# Patient Record
Sex: FEMALE | HISPANIC OR LATINO | URBAN - METROPOLITAN AREA
[De-identification: names, ages, dates, MRNs, and addresses within clinical notes are randomized per-mention and may not be internally consistent; named-entity substitution may affect disease eponyms.]

---

## 2017-06-04 ENCOUNTER — EMERGENCY (EMERGENCY)
Facility: HOSPITAL | Age: 22
LOS: 0 days | Discharge: HOME | End: 2017-06-04
Attending: EMERGENCY MEDICINE | Admitting: EMERGENCY MEDICINE
Payer: MEDICAID

## 2017-06-04 VITALS
HEART RATE: 80 BPM | DIASTOLIC BLOOD PRESSURE: 76 MMHG | WEIGHT: 160.06 LBS | RESPIRATION RATE: 16 BRPM | OXYGEN SATURATION: 100 % | TEMPERATURE: 99 F | SYSTOLIC BLOOD PRESSURE: 115 MMHG | HEIGHT: 62 IN

## 2017-06-04 DIAGNOSIS — O20.0 THREATENED ABORTION: ICD-10-CM

## 2017-06-04 DIAGNOSIS — O20.9 HEMORRHAGE IN EARLY PREGNANCY, UNSPECIFIED: ICD-10-CM

## 2017-06-04 DIAGNOSIS — Z3A.01 LESS THAN 8 WEEKS GESTATION OF PREGNANCY: ICD-10-CM

## 2017-06-04 LAB
ABO RH CONFIRMATION: SIGNIFICANT CHANGE UP
ALBUMIN SERPL ELPH-MCNC: 4.2 G/DL — SIGNIFICANT CHANGE UP (ref 3.3–5)
ALP SERPL-CCNC: 43 U/L — SIGNIFICANT CHANGE UP (ref 40–120)
ALT FLD-CCNC: 19 U/L — SIGNIFICANT CHANGE UP (ref 12–78)
ANION GAP SERPL CALC-SCNC: 6 MMOL/L — SIGNIFICANT CHANGE UP (ref 5–17)
APTT BLD: 30.5 SEC — SIGNIFICANT CHANGE UP (ref 27.5–37.4)
AST SERPL-CCNC: 16 U/L — SIGNIFICANT CHANGE UP (ref 15–37)
BASOPHILS # BLD AUTO: 0.1 K/UL — SIGNIFICANT CHANGE UP (ref 0–0.2)
BASOPHILS NFR BLD AUTO: 0.9 % — SIGNIFICANT CHANGE UP (ref 0–2)
BILIRUB SERPL-MCNC: 1.1 MG/DL — SIGNIFICANT CHANGE UP (ref 0.2–1.2)
BLD GP AB SCN SERPL QL: SIGNIFICANT CHANGE UP
BUN SERPL-MCNC: 6 MG/DL — LOW (ref 7–23)
CALCIUM SERPL-MCNC: 8.8 MG/DL — SIGNIFICANT CHANGE UP (ref 8.5–10.1)
CHLORIDE SERPL-SCNC: 105 MMOL/L — SIGNIFICANT CHANGE UP (ref 96–108)
CO2 SERPL-SCNC: 27 MMOL/L — SIGNIFICANT CHANGE UP (ref 22–31)
CREAT SERPL-MCNC: 0.53 MG/DL — SIGNIFICANT CHANGE UP (ref 0.5–1.3)
EOSINOPHIL # BLD AUTO: 0.1 K/UL — SIGNIFICANT CHANGE UP (ref 0–0.5)
EOSINOPHIL NFR BLD AUTO: 1.7 % — SIGNIFICANT CHANGE UP (ref 0–6)
GLUCOSE SERPL-MCNC: 79 MG/DL — SIGNIFICANT CHANGE UP (ref 70–99)
HCT VFR BLD CALC: 40.6 % — SIGNIFICANT CHANGE UP (ref 34.5–45)
HGB BLD-MCNC: 14.2 G/DL — SIGNIFICANT CHANGE UP (ref 11.5–15.5)
INR BLD: 1.17 RATIO — HIGH (ref 0.88–1.16)
LYMPHOCYTES # BLD AUTO: 1.5 K/UL — SIGNIFICANT CHANGE UP (ref 1–3.3)
LYMPHOCYTES # BLD AUTO: 20.5 % — SIGNIFICANT CHANGE UP (ref 13–44)
MCHC RBC-ENTMCNC: 31.7 PG — SIGNIFICANT CHANGE UP (ref 27–34)
MCHC RBC-ENTMCNC: 34.8 GM/DL — SIGNIFICANT CHANGE UP (ref 32–36)
MCV RBC AUTO: 90.9 FL — SIGNIFICANT CHANGE UP (ref 80–100)
MONOCYTES # BLD AUTO: 0.5 K/UL — SIGNIFICANT CHANGE UP (ref 0–0.9)
MONOCYTES NFR BLD AUTO: 6.8 % — SIGNIFICANT CHANGE UP (ref 2–14)
NEUTROPHILS # BLD AUTO: 5.2 K/UL — SIGNIFICANT CHANGE UP (ref 1.8–7.4)
NEUTROPHILS NFR BLD AUTO: 70.1 % — SIGNIFICANT CHANGE UP (ref 43–77)
PLATELET # BLD AUTO: 277 K/UL — SIGNIFICANT CHANGE UP (ref 150–400)
POTASSIUM SERPL-MCNC: 3.7 MMOL/L — SIGNIFICANT CHANGE UP (ref 3.5–5.3)
POTASSIUM SERPL-SCNC: 3.7 MMOL/L — SIGNIFICANT CHANGE UP (ref 3.5–5.3)
PROT SERPL-MCNC: 7.8 GM/DL — SIGNIFICANT CHANGE UP (ref 6–8.3)
PROTHROM AB SERPL-ACNC: 12.7 SEC — SIGNIFICANT CHANGE UP (ref 9.8–12.7)
RBC # BLD: 4.47 M/UL — SIGNIFICANT CHANGE UP (ref 3.8–5.2)
RBC # FLD: 12 % — SIGNIFICANT CHANGE UP (ref 10.3–14.5)
SODIUM SERPL-SCNC: 138 MMOL/L — SIGNIFICANT CHANGE UP (ref 135–145)
TYPE + AB SCN PNL BLD: SIGNIFICANT CHANGE UP
WBC # BLD: 7.4 K/UL — SIGNIFICANT CHANGE UP (ref 3.8–10.5)
WBC # FLD AUTO: 7.4 K/UL — SIGNIFICANT CHANGE UP (ref 3.8–10.5)

## 2017-06-04 PROCEDURE — 99284 EMERGENCY DEPT VISIT MOD MDM: CPT

## 2017-06-04 PROCEDURE — 76830 TRANSVAGINAL US NON-OB: CPT | Mod: 26

## 2017-06-04 RX ORDER — SODIUM CHLORIDE 9 MG/ML
3 INJECTION INTRAMUSCULAR; INTRAVENOUS; SUBCUTANEOUS ONCE
Qty: 0 | Refills: 0 | Status: COMPLETED | OUTPATIENT
Start: 2017-06-04 | End: 2017-06-04

## 2017-06-04 RX ORDER — ACETAMINOPHEN 500 MG
650 TABLET ORAL ONCE
Qty: 0 | Refills: 0 | Status: COMPLETED | OUTPATIENT
Start: 2017-06-04 | End: 2017-06-04

## 2017-06-04 RX ORDER — SODIUM CHLORIDE 9 MG/ML
1000 INJECTION INTRAMUSCULAR; INTRAVENOUS; SUBCUTANEOUS ONCE
Qty: 0 | Refills: 0 | Status: COMPLETED | OUTPATIENT
Start: 2017-06-04 | End: 2017-06-04

## 2017-06-04 RX ORDER — ONDANSETRON 8 MG/1
4 TABLET, FILM COATED ORAL ONCE
Qty: 0 | Refills: 0 | Status: DISCONTINUED | OUTPATIENT
Start: 2017-06-04 | End: 2017-06-04

## 2017-06-04 RX ADMIN — SODIUM CHLORIDE 1000 MILLILITER(S): 9 INJECTION INTRAMUSCULAR; INTRAVENOUS; SUBCUTANEOUS at 10:05

## 2017-06-04 RX ADMIN — SODIUM CHLORIDE 3 MILLILITER(S): 9 INJECTION INTRAMUSCULAR; INTRAVENOUS; SUBCUTANEOUS at 10:05

## 2017-06-04 NOTE — ED ADULT NURSE NOTE - OBJECTIVE STATEMENT
Pt states she is 6 weeks pregnant and started have a small amount of brown discharge. Denies pain or cramping. Pt states she was worried about the discharge and came here. This is her first pregnancy

## 2017-06-04 NOTE — ED ADULT NURSE NOTE - ED STAT RN HANDOFF DETAILS
mauro rpt from Allison, Constantin - pt approached team asking for crackers, will wait for US results. rcvd rpt from Allison Rn - IV complete, pt ambulatory.

## 2017-06-04 NOTE — ED PROVIDER NOTE - OBJECTIVE STATEMENT
22 year old female 6 weeks EGA, pregnancy with recent US that showed fetal HR, presents with cc of vaginal spotting that progressively worsened into vaginal bleeding and clotting today. Complains of mild diffuse abdominal spasm. No fever or chills. No melena or hematochezia. No dysuria hematuria or frequency. No recent exotic travel. No IVDU. No ETOH abuse. Ambulatory. 22 year old female 6 weeks EGA, pregnancy with recent US that showed fetal HR as per patient, presents with cc of vaginal spotting that progressively worsened into vaginal bleeding and clotting today. Complains of mild diffuse abdominal cramping. No fever or chills. No melena or hematochezia. No dysuria hematuria or frequency. No recent exotic travel. No IVDU. No ETOH abuse. Ambulatory. Complains of mild abdominal cramping, described as mild, non radiating. No vaginal discharge. Had recent gynecological exam at Ob Gyn.

## 2017-06-04 NOTE — ED PROVIDER NOTE - MEDICAL DECISION MAKING DETAILS
Isatu GRAVES: 7 weeks gestation, positive Rh blood, spoke with Dr. Ramey who states that patient to follow up outpatient with gyn and to return if worsening bleeding of if any health concerns. Isatu GRAVES: 7 weeks gestation, positive Rh blood, spoke with Dr. Ramey (Gyn) who states that patient to follow up outpatient with gyn and to return if worsening bleeding of if any health concerns. Instructions for threatened miscarriage and outpatient follow up and instructions to return if any lightheadedness, or worsening of symptoms provided for patient prior to d/c.

## 2017-06-04 NOTE — ED PROVIDER NOTE - CPE EDP CARDIAC NORM
[facility]    [address] [place] [address]    Phone:  [phone]    Fax:  [phone]       Thank You for choosing us for your health care visit. We are glad to serve you and happy to provide you with this summary of your visit. Please help us to ensure we have accurate records. If you find anything that needs to be changed, please let our staff know as soon as possible.          Your Demographic Information     Patient Name Sex     Haley Veloz Female 1987       Ethnic Group Patient Race    Not of  or  Origin White      Your Visit Details     Date & Time Provider Department    2017 10:45 AM Miah Lao MD [facility]      Your Upcoming Appointment*(Max 10)     2017 10:00 AM CDT   Complete Physical Exam with Norma Vázquez MD   [facility]  ([facility])    [address] [place] [id number] [phone]              Your To Do List     Future Orders Please Complete On or Around Expires    CBC & AUTO DIFFERENTIAL  May 01, 2017 May 31, 2017      Conditions Discussed Today or Order-Related Diagnoses        Comments    Routine physical examination    -  Primary       Your Vitals Were     BP Pulse Temp Resp Height Weight    102/60 (BP Location: E, Patient Position: Sitting, Cuff Size: Regular) 64 97.6 °F (36.4 °C) (Tympanic) 12 5' 7\" (1.702 m) 126 lb 7 oz (57.4 kg)    BMI Smoking Status                19.8 kg/m2 Never Smoker          Medications Prescribed or Re-Ordered Today     None      Your Current Medications Are        Disp Refills Start End    Multiple Vitamins-Minerals (MULTIVITAMIN PO)        Class: Historical Med    Route: Oral    docusate sodium (COLACE) 100 MG capsule        Sig - Route: Take 100 mg by mouth daily. - Oral    Class: Historical Med      Discontinued  Medications        Reason for Discontinue    azithromycin (ZITHROMAX) 250 MG tablet Therapy Completed    fluticasone (FLONASE) 50 MCG/ACT nasal spray Therapy Completed    norethindrone (MICRONOR) 0.35 MG tablet Not Needed      Allergies     No Known Allergies      Immunizations History as of 5/1/2017     Name Date    Td:Adult type tetanus/diphtheria 9/12/2011    Tdap 1/5/2016            Patient Instructions     None       normal...

## 2017-07-10 PROBLEM — Z00.00 ENCOUNTER FOR PREVENTIVE HEALTH EXAMINATION: Status: ACTIVE | Noted: 2017-07-10

## 2017-07-11 ENCOUNTER — ASOB RESULT (OUTPATIENT)
Age: 22
End: 2017-07-11

## 2017-07-11 ENCOUNTER — APPOINTMENT (OUTPATIENT)
Dept: ANTEPARTUM | Facility: CLINIC | Age: 22
End: 2017-07-11

## 2017-09-12 ENCOUNTER — APPOINTMENT (OUTPATIENT)
Dept: ANTEPARTUM | Facility: CLINIC | Age: 22
End: 2017-09-12
Payer: MEDICAID

## 2017-09-12 ENCOUNTER — ASOB RESULT (OUTPATIENT)
Age: 22
End: 2017-09-12

## 2017-09-12 PROCEDURE — 76811 OB US DETAILED SNGL FETUS: CPT

## 2017-09-18 ENCOUNTER — OUTPATIENT (OUTPATIENT)
Dept: INPATIENT UNIT | Facility: HOSPITAL | Age: 22
LOS: 1 days | Discharge: HOME | End: 2017-09-18

## 2017-09-18 LAB
ANION GAP SERPL CALC-SCNC: 7 MMOL/L — SIGNIFICANT CHANGE UP (ref 5–17)
BUN SERPL-MCNC: 6 MG/DL — LOW (ref 7–23)
CALCIUM SERPL-MCNC: 8.5 MG/DL — SIGNIFICANT CHANGE UP (ref 8.5–10.1)
CHLORIDE SERPL-SCNC: 105 MMOL/L — SIGNIFICANT CHANGE UP (ref 96–108)
CO2 SERPL-SCNC: 27 MMOL/L — SIGNIFICANT CHANGE UP (ref 22–31)
CREAT SERPL-MCNC: 0.38 MG/DL — LOW (ref 0.5–1.3)
GLUCOSE SERPL-MCNC: 96 MG/DL — SIGNIFICANT CHANGE UP (ref 70–99)
HCT VFR BLD CALC: 31.7 % — LOW (ref 34.5–45)
HGB BLD-MCNC: 11 G/DL — LOW (ref 11.5–15.5)
MCHC RBC-ENTMCNC: 32 PG — SIGNIFICANT CHANGE UP (ref 27–34)
MCHC RBC-ENTMCNC: 34.5 GM/DL — SIGNIFICANT CHANGE UP (ref 32–36)
MCV RBC AUTO: 92.6 FL — SIGNIFICANT CHANGE UP (ref 80–100)
PLATELET # BLD AUTO: 220 K/UL — SIGNIFICANT CHANGE UP (ref 150–400)
POTASSIUM SERPL-MCNC: 3.7 MMOL/L — SIGNIFICANT CHANGE UP (ref 3.5–5.3)
POTASSIUM SERPL-SCNC: 3.7 MMOL/L — SIGNIFICANT CHANGE UP (ref 3.5–5.3)
RBC # BLD: 3.42 M/UL — LOW (ref 3.8–5.2)
RBC # FLD: 12.4 % — SIGNIFICANT CHANGE UP (ref 10.3–14.5)
SODIUM SERPL-SCNC: 139 MMOL/L — SIGNIFICANT CHANGE UP (ref 135–145)
WBC # BLD: 8.9 K/UL — SIGNIFICANT CHANGE UP (ref 3.8–10.5)
WBC # FLD AUTO: 8.9 K/UL — SIGNIFICANT CHANGE UP (ref 3.8–10.5)

## 2017-09-18 RX ORDER — DOCUSATE SODIUM 100 MG
1 CAPSULE ORAL
Qty: 60 | Refills: 2 | OUTPATIENT
Start: 2017-09-18 | End: 2017-12-16

## 2017-09-18 RX ORDER — MAGNESIUM HYDROXIDE 400 MG/1
30 TABLET, CHEWABLE ORAL ONCE
Qty: 0 | Refills: 0 | Status: COMPLETED | OUTPATIENT
Start: 2017-09-18 | End: 2017-09-18

## 2017-09-18 RX ADMIN — MAGNESIUM HYDROXIDE 30 MILLILITER(S): 400 TABLET, CHEWABLE ORAL at 20:40

## 2017-09-25 DIAGNOSIS — O47.9 FALSE LABOR, UNSPECIFIED: ICD-10-CM

## 2017-11-07 ENCOUNTER — APPOINTMENT (OUTPATIENT)
Dept: ANTEPARTUM | Facility: CLINIC | Age: 22
End: 2017-11-07
Payer: MEDICAID

## 2017-11-07 ENCOUNTER — ASOB RESULT (OUTPATIENT)
Age: 22
End: 2017-11-07

## 2017-11-07 PROCEDURE — 76816 OB US FOLLOW-UP PER FETUS: CPT

## 2018-01-05 ENCOUNTER — APPOINTMENT (OUTPATIENT)
Dept: ULTRASOUND IMAGING | Facility: CLINIC | Age: 23
End: 2018-01-05
Payer: MEDICAID

## 2018-01-05 ENCOUNTER — INPATIENT (INPATIENT)
Facility: HOSPITAL | Age: 23
LOS: 2 days | Discharge: HOME | End: 2018-01-08
Attending: OBSTETRICS & GYNECOLOGY | Admitting: OBSTETRICS & GYNECOLOGY

## 2018-01-05 ENCOUNTER — OUTPATIENT (OUTPATIENT)
Dept: OUTPATIENT SERVICES | Facility: HOSPITAL | Age: 23
LOS: 1 days | End: 2018-01-05
Payer: MEDICAID

## 2018-01-05 VITALS — HEIGHT: 62 IN | WEIGHT: 205.03 LBS

## 2018-01-05 DIAGNOSIS — Z00.8 ENCOUNTER FOR OTHER GENERAL EXAMINATION: ICD-10-CM

## 2018-01-05 LAB
ALLERGY+IMMUNOLOGY DIAG STUDY NOTE: SIGNIFICANT CHANGE UP
BASOPHILS # BLD AUTO: 0 K/UL — SIGNIFICANT CHANGE UP (ref 0–0.2)
BASOPHILS NFR BLD AUTO: 0.5 % — SIGNIFICANT CHANGE UP (ref 0–2)
EOSINOPHIL # BLD AUTO: 0.1 K/UL — SIGNIFICANT CHANGE UP (ref 0–0.5)
EOSINOPHIL NFR BLD AUTO: 0.8 % — SIGNIFICANT CHANGE UP (ref 0–6)
HCT VFR BLD CALC: 33.9 % — LOW (ref 34.5–45)
HGB BLD-MCNC: 11.3 G/DL — LOW (ref 11.5–15.5)
LYMPHOCYTES # BLD AUTO: 1.4 K/UL — SIGNIFICANT CHANGE UP (ref 1–3.3)
LYMPHOCYTES # BLD AUTO: 14.7 % — SIGNIFICANT CHANGE UP (ref 13–44)
MCHC RBC-ENTMCNC: 29.8 PG — SIGNIFICANT CHANGE UP (ref 27–34)
MCHC RBC-ENTMCNC: 33.5 GM/DL — SIGNIFICANT CHANGE UP (ref 32–36)
MCV RBC AUTO: 88.9 FL — SIGNIFICANT CHANGE UP (ref 80–100)
MONOCYTES # BLD AUTO: 0.7 K/UL — SIGNIFICANT CHANGE UP (ref 0–0.9)
MONOCYTES NFR BLD AUTO: 6.9 % — SIGNIFICANT CHANGE UP (ref 2–14)
NEUTROPHILS # BLD AUTO: 7.3 K/UL — SIGNIFICANT CHANGE UP (ref 1.8–7.4)
NEUTROPHILS NFR BLD AUTO: 77.1 % — HIGH (ref 43–77)
PLATELET # BLD AUTO: 191 K/UL — SIGNIFICANT CHANGE UP (ref 150–400)
RBC # BLD: 3.81 M/UL — SIGNIFICANT CHANGE UP (ref 3.8–5.2)
RBC # FLD: 13.8 % — SIGNIFICANT CHANGE UP (ref 10.3–14.5)
WBC # BLD: 9.4 K/UL — SIGNIFICANT CHANGE UP (ref 3.8–10.5)
WBC # FLD AUTO: 9.4 K/UL — SIGNIFICANT CHANGE UP (ref 3.8–10.5)

## 2018-01-05 PROCEDURE — 76819 FETAL BIOPHYS PROFIL W/O NST: CPT

## 2018-01-05 PROCEDURE — 76819 FETAL BIOPHYS PROFIL W/O NST: CPT | Mod: 26

## 2018-01-05 RX ORDER — SODIUM CHLORIDE 9 MG/ML
1000 INJECTION, SOLUTION INTRAVENOUS
Qty: 0 | Refills: 0 | Status: DISCONTINUED | OUTPATIENT
Start: 2018-01-05 | End: 2018-01-08

## 2018-01-05 RX ADMIN — SODIUM CHLORIDE 125 MILLILITER(S): 9 INJECTION, SOLUTION INTRAVENOUS at 20:32

## 2018-01-05 NOTE — PATIENT PROFILE OB - BABYS CARE PROVIDER NAME, OB PROFILE
The patient comes in today for a six-week postpartum visit. The patient's had an uncomplicated postpartum course. The patient was able to have a spontaneous vaginal delivery. The patient delivered a female. The patient was induced secondary to hypertension, diabetes and umbilical vein varix.    Physical exam:  General: The patient is an well developed, well nourished female in no acute distress. The patient was oriented to date time and place.     Abdominal exam: The abdomen was soft without masses or tenderness. Liver and spleen were not palpable and there was no hernia present.    Pelvic: The external genitalia were within normal limits. Bartholin urethral and Wawona's glands were negative. There was good support of the bladder and rectum. The vagina was clean. Cervix was normal size posterior location. Uterus was normal size anteverted mobile and non tender. No adnexal masses could be palpated.    Impression: Normal gynecological exam. Screening for cervical cancer    Plan: The patient will be seen again in a year. Patient is planning to use depo-provera for birth control. Pap smear was obtained. If abnormal will need Colposcopy     Jerod

## 2018-01-06 LAB — T PALLIDUM AB TITR SER: NEGATIVE — SIGNIFICANT CHANGE UP

## 2018-01-06 RX ORDER — AER TRAVELER 0.5 G/1
1 SOLUTION RECTAL; TOPICAL EVERY 4 HOURS
Qty: 0 | Refills: 0 | Status: DISCONTINUED | OUTPATIENT
Start: 2018-01-06 | End: 2018-01-08

## 2018-01-06 RX ORDER — SODIUM CHLORIDE 9 MG/ML
1000 INJECTION, SOLUTION INTRAVENOUS
Qty: 0 | Refills: 0 | Status: DISCONTINUED | OUTPATIENT
Start: 2018-01-06 | End: 2018-01-08

## 2018-01-06 RX ORDER — TETANUS TOXOID, REDUCED DIPHTHERIA TOXOID AND ACELLULAR PERTUSSIS VACCINE, ADSORBED 5; 2.5; 8; 8; 2.5 [IU]/.5ML; [IU]/.5ML; UG/.5ML; UG/.5ML; UG/.5ML
0.5 SUSPENSION INTRAMUSCULAR ONCE
Qty: 0 | Refills: 0 | Status: DISCONTINUED | OUTPATIENT
Start: 2018-01-06 | End: 2018-01-08

## 2018-01-06 RX ORDER — IBUPROFEN 200 MG
600 TABLET ORAL EVERY 6 HOURS
Qty: 0 | Refills: 0 | Status: DISCONTINUED | OUTPATIENT
Start: 2018-01-06 | End: 2018-01-08

## 2018-01-06 RX ORDER — OXYCODONE AND ACETAMINOPHEN 5; 325 MG/1; MG/1
2 TABLET ORAL EVERY 6 HOURS
Qty: 0 | Refills: 0 | Status: DISCONTINUED | OUTPATIENT
Start: 2018-01-06 | End: 2018-01-08

## 2018-01-06 RX ORDER — ACETAMINOPHEN 500 MG
650 TABLET ORAL EVERY 6 HOURS
Qty: 0 | Refills: 0 | Status: DISCONTINUED | OUTPATIENT
Start: 2018-01-06 | End: 2018-01-08

## 2018-01-06 RX ORDER — SIMETHICONE 80 MG/1
80 TABLET, CHEWABLE ORAL EVERY 4 HOURS
Qty: 0 | Refills: 0 | Status: DISCONTINUED | OUTPATIENT
Start: 2018-01-06 | End: 2018-01-08

## 2018-01-06 RX ORDER — OXYCODONE AND ACETAMINOPHEN 5; 325 MG/1; MG/1
1 TABLET ORAL
Qty: 0 | Refills: 0 | Status: DISCONTINUED | OUTPATIENT
Start: 2018-01-06 | End: 2018-01-08

## 2018-01-06 RX ORDER — DIBUCAINE 1 %
1 OINTMENT (GRAM) RECTAL EVERY 4 HOURS
Qty: 0 | Refills: 0 | Status: DISCONTINUED | OUTPATIENT
Start: 2018-01-06 | End: 2018-01-08

## 2018-01-06 RX ORDER — PRAMOXINE HYDROCHLORIDE 150 MG/15G
1 AEROSOL, FOAM RECTAL EVERY 4 HOURS
Qty: 0 | Refills: 0 | Status: DISCONTINUED | OUTPATIENT
Start: 2018-01-06 | End: 2018-01-08

## 2018-01-06 RX ORDER — DOCUSATE SODIUM 100 MG
100 CAPSULE ORAL
Qty: 0 | Refills: 0 | Status: DISCONTINUED | OUTPATIENT
Start: 2018-01-06 | End: 2018-01-08

## 2018-01-06 RX ORDER — HYDROCORTISONE 1 %
1 OINTMENT (GRAM) TOPICAL EVERY 4 HOURS
Qty: 0 | Refills: 0 | Status: DISCONTINUED | OUTPATIENT
Start: 2018-01-06 | End: 2018-01-08

## 2018-01-06 RX ORDER — DIPHENHYDRAMINE HCL 50 MG
25 CAPSULE ORAL EVERY 6 HOURS
Qty: 0 | Refills: 0 | Status: DISCONTINUED | OUTPATIENT
Start: 2018-01-06 | End: 2018-01-08

## 2018-01-06 RX ORDER — BUTORPHANOL TARTRATE 2 MG/ML
2 INJECTION, SOLUTION INTRAMUSCULAR; INTRAVENOUS ONCE
Qty: 0 | Refills: 0 | Status: DISCONTINUED | OUTPATIENT
Start: 2018-01-06 | End: 2018-01-06

## 2018-01-06 RX ORDER — OXYTOCIN 10 UNIT/ML
333.33 VIAL (ML) INJECTION
Qty: 20 | Refills: 0 | Status: COMPLETED | OUTPATIENT
Start: 2018-01-06 | End: 2018-01-06

## 2018-01-06 RX ORDER — SODIUM CHLORIDE 9 MG/ML
3 INJECTION INTRAMUSCULAR; INTRAVENOUS; SUBCUTANEOUS EVERY 8 HOURS
Qty: 0 | Refills: 0 | Status: DISCONTINUED | OUTPATIENT
Start: 2018-01-06 | End: 2018-01-08

## 2018-01-06 RX ORDER — OXYTOCIN 10 UNIT/ML
2 VIAL (ML) INJECTION
Qty: 20 | Refills: 0 | Status: DISCONTINUED | OUTPATIENT
Start: 2018-01-06 | End: 2018-01-08

## 2018-01-06 RX ORDER — GLYCERIN ADULT
1 SUPPOSITORY, RECTAL RECTAL AT BEDTIME
Qty: 0 | Refills: 0 | Status: DISCONTINUED | OUTPATIENT
Start: 2018-01-06 | End: 2018-01-08

## 2018-01-06 RX ORDER — MAGNESIUM HYDROXIDE 400 MG/1
30 TABLET, CHEWABLE ORAL
Qty: 0 | Refills: 0 | Status: DISCONTINUED | OUTPATIENT
Start: 2018-01-06 | End: 2018-01-08

## 2018-01-06 RX ORDER — LANOLIN
1 OINTMENT (GRAM) TOPICAL
Qty: 0 | Refills: 0 | Status: DISCONTINUED | OUTPATIENT
Start: 2018-01-06 | End: 2018-01-08

## 2018-01-06 RX ORDER — SIMETHICONE 80 MG/1
80 TABLET, CHEWABLE ORAL EVERY 6 HOURS
Qty: 0 | Refills: 0 | Status: DISCONTINUED | OUTPATIENT
Start: 2018-01-06 | End: 2018-01-08

## 2018-01-06 RX ORDER — OXYTOCIN 10 UNIT/ML
41.66 VIAL (ML) INJECTION
Qty: 20 | Refills: 0 | Status: DISCONTINUED | OUTPATIENT
Start: 2018-01-06 | End: 2018-01-08

## 2018-01-06 RX ORDER — LANOLIN
1 OINTMENT (GRAM) TOPICAL EVERY 6 HOURS
Qty: 0 | Refills: 0 | Status: DISCONTINUED | OUTPATIENT
Start: 2018-01-06 | End: 2018-01-08

## 2018-01-06 RX ORDER — OXYTOCIN 10 UNIT/ML
41.67 VIAL (ML) INJECTION
Qty: 20 | Refills: 0 | Status: DISCONTINUED | OUTPATIENT
Start: 2018-01-06 | End: 2018-01-08

## 2018-01-06 RX ORDER — OXYTOCIN 10 UNIT/ML
2 VIAL (ML) INJECTION
Qty: 30 | Refills: 0 | Status: DISCONTINUED | OUTPATIENT
Start: 2018-01-06 | End: 2018-01-08

## 2018-01-06 RX ORDER — FERROUS SULFATE 325(65) MG
325 TABLET ORAL DAILY
Qty: 0 | Refills: 0 | Status: DISCONTINUED | OUTPATIENT
Start: 2018-01-06 | End: 2018-01-08

## 2018-01-06 RX ADMIN — SODIUM CHLORIDE 125 MILLILITER(S): 9 INJECTION, SOLUTION INTRAVENOUS at 07:46

## 2018-01-06 RX ADMIN — BUTORPHANOL TARTRATE 2 MILLIGRAM(S): 2 INJECTION, SOLUTION INTRAMUSCULAR; INTRAVENOUS at 05:42

## 2018-01-06 RX ADMIN — SODIUM CHLORIDE 125 MILLILITER(S): 9 INJECTION, SOLUTION INTRAVENOUS at 08:39

## 2018-01-06 RX ADMIN — Medication 600 MILLIGRAM(S): at 20:47

## 2018-01-06 RX ADMIN — Medication 1000 MILLIUNIT(S)/MIN: at 11:30

## 2018-01-06 RX ADMIN — Medication 600 MILLIGRAM(S): at 13:44

## 2018-01-07 LAB
BASOPHILS # BLD AUTO: 0 K/UL — SIGNIFICANT CHANGE UP (ref 0–0.2)
BASOPHILS # BLD AUTO: 0.1 K/UL — SIGNIFICANT CHANGE UP (ref 0–0.2)
BASOPHILS NFR BLD AUTO: 0.4 % — SIGNIFICANT CHANGE UP (ref 0–2)
BASOPHILS NFR BLD AUTO: 0.7 % — SIGNIFICANT CHANGE UP (ref 0–2)
EOSINOPHIL # BLD AUTO: 0.1 K/UL — SIGNIFICANT CHANGE UP (ref 0–0.5)
EOSINOPHIL # BLD AUTO: 0.2 K/UL — SIGNIFICANT CHANGE UP (ref 0–0.5)
EOSINOPHIL NFR BLD AUTO: 0.5 % — SIGNIFICANT CHANGE UP (ref 0–6)
EOSINOPHIL NFR BLD AUTO: 1.3 % — SIGNIFICANT CHANGE UP (ref 0–6)
HCT VFR BLD CALC: 33.4 % — LOW (ref 34.5–45)
HCT VFR BLD CALC: 33.7 % — LOW (ref 34.5–45)
HGB BLD-MCNC: 10.7 G/DL — LOW (ref 11.5–15.5)
HGB BLD-MCNC: 11 G/DL — LOW (ref 11.5–15.5)
LYMPHOCYTES # BLD AUTO: 1.4 K/UL — SIGNIFICANT CHANGE UP (ref 1–3.3)
LYMPHOCYTES # BLD AUTO: 1.5 K/UL — SIGNIFICANT CHANGE UP (ref 1–3.3)
LYMPHOCYTES # BLD AUTO: 12.2 % — LOW (ref 13–44)
LYMPHOCYTES # BLD AUTO: 13.6 % — SIGNIFICANT CHANGE UP (ref 13–44)
MCHC RBC-ENTMCNC: 28.7 PG — SIGNIFICANT CHANGE UP (ref 27–34)
MCHC RBC-ENTMCNC: 29.2 PG — SIGNIFICANT CHANGE UP (ref 27–34)
MCHC RBC-ENTMCNC: 32.2 GM/DL — SIGNIFICANT CHANGE UP (ref 32–36)
MCHC RBC-ENTMCNC: 32.5 GM/DL — SIGNIFICANT CHANGE UP (ref 32–36)
MCV RBC AUTO: 89.3 FL — SIGNIFICANT CHANGE UP (ref 80–100)
MCV RBC AUTO: 89.6 FL — SIGNIFICANT CHANGE UP (ref 80–100)
MONOCYTES # BLD AUTO: 0.6 K/UL — SIGNIFICANT CHANGE UP (ref 0–0.9)
MONOCYTES # BLD AUTO: 0.7 K/UL — SIGNIFICANT CHANGE UP (ref 0–0.9)
MONOCYTES NFR BLD AUTO: 5.4 % — SIGNIFICANT CHANGE UP (ref 2–14)
MONOCYTES NFR BLD AUTO: 6 % — SIGNIFICANT CHANGE UP (ref 2–14)
NEUTROPHILS # BLD AUTO: 8.7 K/UL — HIGH (ref 1.8–7.4)
NEUTROPHILS # BLD AUTO: 9.6 K/UL — HIGH (ref 1.8–7.4)
NEUTROPHILS NFR BLD AUTO: 79.2 % — HIGH (ref 43–77)
NEUTROPHILS NFR BLD AUTO: 80.6 % — HIGH (ref 43–77)
PLATELET # BLD AUTO: 162 K/UL — SIGNIFICANT CHANGE UP (ref 150–400)
PLATELET # BLD AUTO: 185 K/UL — SIGNIFICANT CHANGE UP (ref 150–400)
RBC # BLD: 3.74 M/UL — LOW (ref 3.8–5.2)
RBC # BLD: 3.76 M/UL — LOW (ref 3.8–5.2)
RBC # FLD: 14 % — SIGNIFICANT CHANGE UP (ref 10.3–14.5)
RBC # FLD: 14.1 % — SIGNIFICANT CHANGE UP (ref 10.3–14.5)
WBC # BLD: 11 K/UL — HIGH (ref 3.8–10.5)
WBC # BLD: 11.8 K/UL — HIGH (ref 3.8–10.5)
WBC # FLD AUTO: 11 K/UL — HIGH (ref 3.8–10.5)
WBC # FLD AUTO: 11.8 K/UL — HIGH (ref 3.8–10.5)

## 2018-01-07 RX ADMIN — Medication 600 MILLIGRAM(S): at 12:22

## 2018-01-07 RX ADMIN — Medication 325 MILLIGRAM(S): at 12:18

## 2018-01-07 RX ADMIN — Medication 600 MILLIGRAM(S): at 05:59

## 2018-01-07 RX ADMIN — Medication 600 MILLIGRAM(S): at 20:07

## 2018-01-07 RX ADMIN — Medication 100 MILLIGRAM(S): at 12:19

## 2018-01-07 RX ADMIN — Medication 1 TABLET(S): at 12:18

## 2018-01-07 RX ADMIN — Medication 1 TABLET(S): at 12:19

## 2018-01-07 RX ADMIN — OXYCODONE AND ACETAMINOPHEN 1 TABLET(S): 5; 325 TABLET ORAL at 21:18

## 2018-01-07 RX ADMIN — MAGNESIUM HYDROXIDE 30 MILLILITER(S): 400 TABLET, CHEWABLE ORAL at 21:18

## 2018-01-07 NOTE — PROGRESS NOTE ADULT - ATTENDING COMMENTS
seen patient by me.   No complaints  Heart and lungs- wnl  breat- not engorged  abdomen soft, uterus well contracted  normal lochial flow  no calf tenderness  A/p - s/p vaginal delivery  continue same management

## 2018-01-07 NOTE — PROGRESS NOTE ADULT - SUBJECTIVE AND OBJECTIVE BOX
23 yo  F s/p  with Right ML episiotomy (PPD #1) was admitted for labor at term. Pt was seen and examined at bedside. She is lying in bed comfortably. She is eating and drinking well, passing flatus, and voiding on her own. Pt notes she has not had any bowel movements, but denies any straining. Pt has been OOB and ambulating, She denies any fever, chills, CP or SOB.  Pt is breastfeeding and bonding with her baby.       VS BP     Physical Exam  Gen: lying in bed in NAD, AAOx3  Cardio: S1, S2  Pulm: CTA b/l  Breasts: nonengorged  Abd: BS+, soft, nontender, uterus firm and below umbilicus  Lochia: Scant lochia rubra  Extr: no pedal edema, pulses 2+    A/P: 23 yo  F s/p , PPD#1  - OOB and ambulation encouraged  - Colace PRN constipation  - Pt counseled on the importance of not straining during bowel movements  - Breastfeeding supported and encouraged  - Ibuprofen PRN pain      D/w Dr. Carmona 21 yo  F s/p  with Right ML episiotomy (PPD #1) was admitted for labor at term. Pt was seen and examined at bedside. She is lying in bed comfortably. She is eating and drinking well, passing flatus, and voiding on her own. Pt notes she has not had any bowel movements, but denies any straining. Pt has been OOB and ambulating, She denies any fever, chills, CP or SOB.  Pt is breastfeeding and bonding with her baby.       Vital Signs Last 24 Hrs  T(C): 36.8 (2018 08:19), Max: 36.8 (2018 19:37)  T(F): 98.2 (2018 08:19), Max: 98.3 (2018 19:37)  HR: 90 (2018 08:30) (86 - 104)  BP: 120/- (2018 08:19) (90/50 - 129/73)  RR: 16 (2018 08:19) (16 - 18)  SpO2: 99% (2018 08:19) (99% - 100%)VS BP     Physical Exam  Gen: lying in bed in NAD, AAOx3  Cardio: S1, S2  Pulm: CTA b/l  Breasts: nonengorged  Abd: BS+, soft, nontender, uterus firm and below umbilicus  Lochia: Scant lochia rubra  Extr: no pedal edema, pulses 2+    Postpartum Labs                        10.7   11.0  )-----------( 162      ( 2018 07:26 )             33.4       A/P: 21 yo  F s/p , PPD#1  - OOB and ambulation encouraged  - Colace PRN constipation  - Pt counseled on the importance of not straining during bowel movements  - Breastfeeding supported and encouraged  - Ibuprofen PRN pain      D/w Dr. Carmona

## 2018-01-08 ENCOUNTER — TRANSCRIPTION ENCOUNTER (OUTPATIENT)
Age: 23
End: 2018-01-08

## 2018-01-08 VITALS
TEMPERATURE: 98 F | HEART RATE: 90 BPM | SYSTOLIC BLOOD PRESSURE: 111 MMHG | DIASTOLIC BLOOD PRESSURE: 77 MMHG | RESPIRATION RATE: 16 BRPM | OXYGEN SATURATION: 98 %

## 2018-01-08 RX ORDER — HYDROCORTISONE 1 %
1 OINTMENT (GRAM) TOPICAL
Qty: 0 | Refills: 0 | COMMUNITY
Start: 2018-01-08

## 2018-01-08 RX ORDER — LANOLIN
1 OINTMENT (GRAM) TOPICAL
Qty: 0 | Refills: 0 | COMMUNITY
Start: 2018-01-08

## 2018-01-08 RX ORDER — AER TRAVELER 0.5 G/1
1 SOLUTION RECTAL; TOPICAL
Qty: 0 | Refills: 0 | COMMUNITY
Start: 2018-01-08 | End: 2018-01-31

## 2018-01-08 RX ORDER — IBUPROFEN 200 MG
1 TABLET ORAL
Qty: 120 | Refills: 0 | OUTPATIENT
Start: 2018-01-08 | End: 2018-02-06

## 2018-01-08 RX ORDER — DIBUCAINE 1 %
1 OINTMENT (GRAM) RECTAL
Qty: 0 | Refills: 0 | COMMUNITY
Start: 2018-01-08

## 2018-01-08 RX ORDER — AER TRAVELER 0.5 G/1
1 SOLUTION RECTAL; TOPICAL
Qty: 0 | Refills: 0 | COMMUNITY
Start: 2018-01-08

## 2018-01-08 RX ADMIN — Medication 100 MILLIGRAM(S): at 11:34

## 2018-01-08 RX ADMIN — Medication 600 MILLIGRAM(S): at 11:34

## 2018-01-08 RX ADMIN — Medication 1 TABLET(S): at 11:34

## 2018-01-08 RX ADMIN — Medication 600 MILLIGRAM(S): at 04:51

## 2018-01-08 NOTE — DISCHARGE NOTE OB - CARE PROVIDER_API CALL
Mckenzie Ricci), Obstetrics and Gynecology  [address] [place] [address]  Phone: [phone]  Fax: [phone]

## 2018-01-08 NOTE — DISCHARGE NOTE OB - CARE PLAN
Principal Discharge DX:	Vaginal delivery  Goal:	normal transition to the postpartum period  Instructions for follow-up, activity and diet:	No heavy lifting  Nothing per vagina x 6 weeks  Ambulate, increase hydration

## 2018-01-08 NOTE — DISCHARGE NOTE OB - MATERIALS PROVIDED
Breastfeeding Mother’s Support Group Information/Guide to Postpartum Care/Back To Sleep Handout/Vaccinations/[facility]  Screening Program/Shaken Baby Prevention Handout/Discharge Medication Information for Patients and Families Pocket Guide/Bottle Feeding Log/[place]  Immunization Record/Breastfeeding Log/[facility] Hearing Screen Program/Breastfeeding Guide and Packet

## 2018-01-08 NOTE — DISCHARGE NOTE OB - MEDICATION SUMMARY - MEDICATIONS TO TAKE
I will START or STAY ON the medications listed below when I get home from the hospital:    ibuprofen 600 mg oral tablet  -- 1 tab(s) by mouth every 6 hours, As Needed -Moderate Pain MDD:4 tabs daily   -- Indication: For vaginal delivery    dibucaine 1% topical ointment  -- 1 application on skin every 4 hours, As needed, Tenderness of the episiotomy site  -- Indication: For vaginal delivery    dibucaine 1% topical ointment  -- 1 application on skin every 4 hours, As needed, Perineal Discomfort  -- Indication: For vaginal delivery    hydrocortisone 1% topical cream  -- 1 application on skin every 4 hours, As needed, Moderate to Severe Perineal Pain  -- Indication: For vaginal delivery    hydrocortisone 1% topical cream  -- 1 application on skin every 4 hours, As needed, Moderate to Severe Perineal Pain  -- Indication: For vaginal delivery    lanolin topical ointment  -- 1 application on skin every 3 hours, As needed, Sore Nipples  -- Indication: For vaginal delivery    lanolin topical ointment  -- 1 application on skin every 6 hours, As needed, Sore Nipples  -- Indication: For vaginal delivery    witch hazel 50% rectal pad  -- 1  rectally , As Needed  -- Indication: For vaginal delivery    witch hazel 50% rectal pad  -- 1  rectally , As Needed  -- Indication: For vaginal delivery    Prenatal Multivitamins with Folic Acid 1 mg oral tablet  -- 1 tab(s) by mouth once a day  -- Indication: For vaginal delivery    Prenatal Multivitamins with Folic Acid 1 mg oral tablet  -- 1 tab(s) by mouth once a day  -- Indication: For vaginal delivery    Colace 100 mg oral capsule  -- 1 cap(s) by mouth 2 times a day -for constipation   -- Medication should be taken with plenty of water.    -- Indication: For vaginal delivery

## 2018-01-08 NOTE — DISCHARGE NOTE OB - PATIENT PORTAL LINK FT
“You can access the FollowHealth Patient Portal, offered by [facility], by registering with the following website: [email or web address]”

## 2018-01-08 NOTE — PROGRESS NOTE ADULT - SUBJECTIVE AND OBJECTIVE BOX
PPD# 2    Pt sitting up holding baby in arms. Reports minimal low abdominal pain that resolves with ibuprofen. Denies heavy bleeding or large clothes. +void, no BM.  Reports receiving TDAP vaccine and desires nexplanon for contraception.  Breast and bottle feeding; c/o no milk.  Vital Signs Last 24 Hrs  T(C): 36.7 (08 Jan 2018 00:07), Max: 36.8 (07 Jan 2018 08:19)  T(F): 98.1 (08 Jan 2018 00:07), Max: 98.2 (07 Jan 2018 08:19)  HR: 91 (08 Jan 2018 00:07) (90 - 101)  BP: 115/72 (08 Jan 2018 00:07) (115/72 - 120/-)  BP(mean): --  RR: 16 (08 Jan 2018 00:07) (16 - 16)  SpO2: 100% (08 Jan 2018 00:07) (99% - 100%)             postpartum             11.0   11.8  )-----------( 185      ( 07 Jan 2018 19:44 )             33.7     Breasts: soft, nontender, no red streaks, nipples intact  Abdomen: soft, fundus firm, midline and 1 cm below umbilicus  Ellie: healing well, with minimal swelling, no hemorrhoids  Extremity: nontender per pt, no varicosites    23yo P1 normal postpartum female, healing well  Discharge home today  Routine postpartum care  Encouraged exclusive breastfeeding; reviewed correlation between milk supply and early bottle introduction  Nothing per vagina x 6 weeks  Increase hydration/high fiber diet  Avoid heavy lifting  Warning signs reviewed  Follow up with [facility] in 6 weeks

## 2018-01-08 NOTE — DISCHARGE NOTE OB - PLAN OF CARE
normal transition to the postpartum period No heavy lifting  Nothing per vagina x 6 weeks  Ambulate, increase hydration

## 2018-01-11 DIAGNOSIS — Z3A.37 37 WEEKS GESTATION OF PREGNANCY: ICD-10-CM

## 2018-01-11 DIAGNOSIS — Z34.03 ENCOUNTER FOR SUPERVISION OF NORMAL FIRST PREGNANCY, THIRD TRIMESTER: ICD-10-CM

## 2018-01-11 DIAGNOSIS — Z79.899 OTHER LONG TERM (CURRENT) DRUG THERAPY: ICD-10-CM

## 2018-01-11 DIAGNOSIS — Z91.14 PATIENT'S OTHER NONCOMPLIANCE WITH MEDICATION REGIMEN: ICD-10-CM

## 2018-01-11 DIAGNOSIS — A60.00 HERPESVIRAL INFECTION OF UROGENITAL SYSTEM, UNSPECIFIED: ICD-10-CM

## 2020-01-13 ENCOUNTER — EMERGENCY (EMERGENCY)
Facility: HOSPITAL | Age: 25
LOS: 0 days | Discharge: HOME | End: 2020-01-13
Attending: EMERGENCY MEDICINE
Payer: MEDICAID

## 2020-01-13 VITALS — HEIGHT: 63 IN | WEIGHT: 149.91 LBS

## 2020-01-13 VITALS
TEMPERATURE: 98 F | SYSTOLIC BLOOD PRESSURE: 110 MMHG | HEART RATE: 89 BPM | OXYGEN SATURATION: 96 % | RESPIRATION RATE: 18 BRPM | DIASTOLIC BLOOD PRESSURE: 76 MMHG
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
## 2020-01-13 DIAGNOSIS — S62.607A FRACTURE OF UNSPECIFIED PHALANX OF LEFT LITTLE FINGER, INITIAL ENCOUNTER FOR CLOSED FRACTURE: ICD-10-CM
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
## 2020-01-13 DIAGNOSIS — M79.645 PAIN IN LEFT FINGER(S): ICD-10-CM

## 2020-01-13 PROCEDURE — 73140 X-RAY EXAM OF FINGER(S): CPT | Mod: LT

## 2020-01-13 PROCEDURE — 99282 EMERGENCY DEPT VISIT SF MDM: CPT

## 2020-01-13 PROCEDURE — 99283 EMERGENCY DEPT VISIT LOW MDM: CPT | Mod: 25

## 2020-01-13 PROCEDURE — 73140 X-RAY EXAM OF FINGER(S): CPT | Mod: 26,LT

## 2020-01-13 PROCEDURE — 29130 APPL FINGER SPLINT STATIC: CPT | Mod: F4

## 2020-01-13 PROCEDURE — 29130 APPL FINGER SPLINT STATIC: CPT

## 2020-01-13 NOTE — ED ADULT NURSE NOTE - OBJECTIVE STATEMENT
Patient presents with left fifth finger pain, reports she closed a window on her finger. Patient reports pain constant since injury

## 2020-01-13 NOTE — ED STATDOCS - PROGRESS NOTE DETAILS
23 y/o female with no pertinent PMHx presents to the ED c/o L 5th digit pain s/p accidently closing a window on her finger on 12/31. States pain is constant and does not improve. No other complaints at this time.   Starr Silveira PA-C Finger fx.  Splinted.  Will DC with ortho hand follow up.  Starr Silveira PA-C

## 2020-01-13 NOTE — ED STATDOCS - NSFOLLOWUPCLINICS_GEN_ALL_ED_FT
[facility]  Family Medicine  [address] [place] [address]  Phone: [phone]  Fax:   Follow Up Time:

## 2020-01-13 NOTE — ED STATDOCS - NSFOLLOWUPINSTRUCTIONS_ED_ALL_ED_FT
Fractura de dedo en los adultos  Finger Fracture, Adult  La fractura de dedo es la ruptura de cualquiera de los huesos de los dedos.  ¿Cuáles son las causas?  La causa principal de fracturas de dedo es ulices lesión, bharath por practicar deportes, caerse, o cerrar un cajón o ulices joão.  ¿Qué incrementa el riesgo?  Los siguientes factores pueden hacer que usted sea más propenso a tener esta afección:  Practicar deportes.Actividades en el lugar de trabajo que incluyen el uso de maquinaria.Osteoporosis. Esta afección hace que los huesos pierdan densidad y que se fracturen con facilidad.¿Cuáles son los signos o síntomas?  Los síntomas principales de ulices fractura de dedo son dolor, hematoma e hinchazón poco después de la lesión. Otros síntomas pueden ser los siguientes:  Rigidez.Huesos expuestos (fractura compuesta o fractura abierta), en casos graves.¿Cómo se diagnostica?  Esta afección se diagnostica en función de un examen físico, palumbo historia clínica o antecedentes médicos y los síntomas que presente. Se realizará también ulices radiografía para confirmar el diagnóstico.  ¿Cómo se trata?  El tratamiento de esta afección depende de la gravedad de la fractura. Si los huesos siguen en palumbo lugar, pueden colocarle ulices férula en el dedo para mantenerlo quieto mientras se consolida (inmovilización). Si los huesos están fuera de lugar, el médico puede reacomodarlos con la mano (de forma manual) o mediante cirugía.  También es posible que le indiquen hacer ejercicios para recuperar la fuerza y la flexibilidad (fisioterapia) del dedo.  Siga estas indicaciones en palumbo casa:  Si tiene ulices férula:        Use la férula bharath se lo haya indicado el médico. Quítesela solamente bharath se lo haya indicado el médico.No ejerza presión en ninguna parte de la férula hasta que se haya endurecido por completo. Neligh puede tardar varias horas.Afloje la férula si los dedos se le entumecen, siente hormigueo o se le enfrían y se tornan de color michell.Mantenga la férula limpia.Si la férula no es impermeable:  No deje que se moje.Cúbrala con un envoltorio hermético cuando tome un baño de inmersión o ulices ducha.Pregúntele al médico cuándo es seguro volver a conducir.Control del dolor, la rigidez y la hinchazón     Si se lo indican, aplique hielo sobre la lizandro de la lesión:  Si tiene ulices férula desmontable, quítesela bharath se lo haya indicado el médico.Ponga el hielo en ulices bolsa plástica.Coloque ulices toalla entre la piel y la bolsa de hielo.Coloque el hielo vitaliy 20 minutos, de 2 a 3 veces por día.Mueva los dedos con frecuencia para evitar la rigidez y reducir la hinchazón.Cuando esté sentado o acostado, alce (eleve) la lizandro de la lesión por encima del nivel del corazón.Actividad     No conduzca ni use maquinaria pesada mientras barry analgésicos recetados.Joey los ejercicios de fisioterapia bharath se lo haya indicado el médico.Retome torey actividades normales bharath se lo haya indicado el médico. Pregúntele al médico qué actividades son seguras para usted.Instrucciones generales     No consuma ningún producto que contenga nicotina o tabaco, bharath cigarrillos y cigarrillos electrónicos. Estos pueden retrasar la consolidación del hueso. Si necesita ayuda para dejar de fumar, consulte al médico.[place] los medicamentos de venta elzbieta y los recetados solamente bharath se lo haya indicado el médico.Concurra a todas las visitas de control bharath se lo haya indicado el médico. Neligh es importante.Comuníquese con un médico si:  El dolor o la hinchazón empeoran incluso con tratamiento.Tiene dificultad para  el dedo.Solicite ayuda de inmediato si:  Tiene adormecimiento en el dedo o se le pone de color yolanda o azulado.Resumen  La fractura de dedo es la ruptura de cualquiera de los huesos de los dedos.La principal causa de las fracturas de dedo es ulices lesión.El tratamiento de esta afección depende de la gravedad de la fractura.Esta información no tiene bharath fin reemplazar el consejo del médico. Asegúrese de hacerle al médico cualquier pregunta que tenga.

## 2020-01-13 NOTE — ED STATDOCS - CARE PROVIDER_API CALL
Johnny Marte)  Orthopaedic Surgery; Surgery of the Hand  [address] [place] [address]  Phone: [phone]  Fax: [phone]  Follow Up Time:

## 2020-01-13 NOTE — ED STATDOCS - PATIENT PORTAL LINK FT
You can access the FollowMyHealth Patient Portal offered by [facility] by registering at the following website: [email or web address]. By joining "[employer]’s FollowMyHealth portal, you will also be able to view your health information using other applications (apps) compatible with our system.

## 2020-01-13 NOTE — ED STATDOCS - OBJECTIVE STATEMENT
23 y/o female with no pertinent PMHx presents to the ED c/o L 5th digit pain s/p accidently closing a window on her finger on 12/31. States pain is constant and does not improve. No other complaints at this time.

## 2021-06-26 ENCOUNTER — EMERGENCY (EMERGENCY)
Facility: HOSPITAL | Age: 26
LOS: 0 days | Discharge: HOME | End: 2021-06-26
Attending: EMERGENCY MEDICINE
Payer: COMMERCIAL

## 2021-06-26 VITALS
OXYGEN SATURATION: 94 % | TEMPERATURE: 98 F | SYSTOLIC BLOOD PRESSURE: 118 MMHG | HEART RATE: 85 BPM | DIASTOLIC BLOOD PRESSURE: 79 MMHG | RESPIRATION RATE: 16 BRPM

## 2021-06-26 VITALS — HEIGHT: 63 IN | WEIGHT: 179.9 LBS
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
## 2021-06-26 DIAGNOSIS — R51.9 HEADACHE, UNSPECIFIED: ICD-10-CM

## 2021-06-26 DIAGNOSIS — M54.2 CERVICALGIA: ICD-10-CM

## 2021-06-26 DIAGNOSIS — S13.9XXA SPRAIN OF JOINTS AND LIGAMENTS OF UNSPECIFIED PARTS OF NECK, INITIAL ENCOUNTER: ICD-10-CM

## 2021-06-26 PROCEDURE — 99284 EMERGENCY DEPT VISIT MOD MDM: CPT

## 2021-06-26 PROCEDURE — 99283 EMERGENCY DEPT VISIT LOW MDM: CPT

## 2021-06-26 RX ORDER — CYCLOBENZAPRINE HYDROCHLORIDE 10 MG/1
1 TABLET, FILM COATED ORAL
Qty: 21 | Refills: 0
Start: 2021-06-26 | End: 2021-07-02

## 2021-06-26 RX ORDER — ACETAMINOPHEN 500 MG
650 TABLET ORAL ONCE
Refills: 0 | Status: COMPLETED | OUTPATIENT
Start: 2021-06-26 | End: 2021-06-26

## 2021-06-26 RX ADMIN — Medication 650 MILLIGRAM(S): at 20:27

## 2021-06-26 NOTE — ED STATDOCS - MUSCULOSKELETAL, MLM
range of motion is not limited and there is no muscle tenderness. range of motion is not limited +b/l paracervical TTP, no step off, nor deformity nor midline spinal tenderness, normal ROM

## 2021-06-26 NOTE — ED STATDOCS - TOBACCO USE
-- Message is from the [facility]--    Reason for Call: Patient would like to speak with  regarding suggestions of possibly a cardiologist she know that take her insurance.    Caller Information       Type Contact Phone    02/24/2021 03:15 PM CST Phone (Incoming) Serenity Ramon (Self) [phone] (M)          Alternative phone number:     Turnaround time given to caller:   This message will be sent to [state Provider's name]. The clinical team will fulfill your request as soon as they review your message. Please be aware that you can also contact your physician by entering your message in "[employer] our online portal.   Unknown if ever smoked
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
## 2021-06-26 NOTE — ED STATDOCS - CARE PROVIDER_API CALL
Shelby Greer  FAMILY MEDICINE  [address] [place] [address]  Phone: [phone]  Fax: [phone]  Follow Up Time:     Charles Akers)  Family Medicine  [address] [place] [address]  Phone: [phone]  Fax: [phone]  Follow Up Time:

## 2021-06-26 NOTE — ED STATDOCS - NSFOLLOWUPINSTRUCTIONS_ED_ALL_ED_FT
Esguince cervical    Cervical Sprain      Un esguince cervical es ulices distensión o un desgarro de tima o más de los ligamentos del lois. Los ligamentos son los tejidos que conectan los huesos. Un esguince cervical puede ser desde muy leve a muy grave. En los casos graves, el esguince cervical puede hacer que los huesos de la columna (vértebras) en el lois se vuelvan inestables. Paraje puede juan lugar a daño en la médula dash y problemas graves del sistema nervioso.    El tiempo que demora la curación de un esguince cervical depende de la causa y de la extensión de la lesión. La mayoría de las veces se curan en 4 a 6 semanas.      ¿Cuáles son las causas?    Los esguinces cervicales pueden producirse por un traumatismo bharath, por ejemplo, ulices lesión recibida en un accidente automovilístico, ulices caída o un movimiento brusco de adelante hacia atrás de la pk y el lois (latigazo cervical). Los esguinces cervicales leves pueden deberse al desgaste que se produce con el paso del tiempo.      ¿Qué incrementa el riesgo?  Los siguientes factores pueden hacer que sea más propenso a contraer esta afección:  •Realizar actividades que conllevan un alto riesgo de sufrir un traumatismo en el lois. Estos incluyen los deportes de contacto, las sam de automóviles, la gimnasia y el buceo.      •Asumir riesgos al conducir o montar en un vehículo motorizado.      •Artrosis de columna.      •Dundee fuerza y flexibilidad en el lois.      •Tener ulices lesión previa en el lois.      •Carmen postura.      •Estar vitaliy períodos de tiempo prolongados en ciertas posiciones que estresan el lois, bharath sentarse albert de la computadora vitaliy un danita tiempo.        ¿Cuáles son los signos o síntomas?  Los síntomas de esta afección incluyen:  •Dolor, inflamación, entumecimiento, dolor a la palpación, hinchazón o ulices sensación de ardor en el frente, la parte posterior y los laterales del lois, los hombros o la parte superior de la espalda.      •Rigidez repentina de los músculos del lois (espasmos).      •Capacidad limitada para  el lois.      •Dolor de pk.      •Mareos.      •Náuseas o vómitos.      •Debilidad, adormecimiento u hormigueo en la mano o el brazo.      Los síntomas pueden manifestarse inmediatamente después de la lesión o en el transcurso de algunos días. En algunos casos, los síntomas pueden desaparecer con tratamiento y volver a aparecer (ser recurrentes) más adelante.      ¿Cómo se diagnostica?  Esta afección se puede diagnosticar en función de lo siguiente:  •Alyssa antecedentes médicos.      •Alyssa síntomas.      •Cualquier lesión reciente o problemas conocidos en el lois que tenga, bharath artritis en el lois.      •Un examen físico.      •Estudios de diagnóstico por imágenes, bharath radiografías, resonancia magnética (RM) y exploración por tomografía computarizada (TC).        ¿Cómo se trata?  Esta afección se trata con reposo y la aplicación de hielo en la lizandro lesionada, y con ejercicios de fisioterapia. La terapia con calor puede usarse de 2 a 3 días después de la lesión si no hay hinchazón. El tratamiento varía en función de la gravedad de la afección y puede incluir lo siguiente:•Mantener el lois fijo (inmovilizado) vitaliy períodos de tiempo. Paraje se puede hacer mediante lo siguiente:  •Un collarín cervical. Joya sostiene el mentón y la parte posterior del lois.      •Un dispositivo de tracción cervical. Se trata de un cabestrillo que sostiene el lois. Joya dispositivo danielle peso y presión del lois, y puede ayudar a aliviar el dolor.        •Medicamentos que ayudan a aliviar el dolor y la inflamación.      •Medicamentos que ayudan relajar los músculos (relajantes musculares).      •Cirugía. Paraje es poco frecuente.        Siga estas instrucciones en palumbo casa:      Medicamentos      •Use los medicamentos de venta elzbieta y los recetados solamente bharath se lo haya indicado el médico.    •Pregúntele al médico si el medicamento recetado:  •Hace necesario que evite conducir o usar maquinaria pesada.    •Puede causarle estreñimiento. Es posible que tenga que juanita estas medidas para prevenir o tratar el estreñimiento:  •Beber suficiente líquido bharath para mantener la orina de color amarillo pálido.      •Usar medicamentos recetados o de venta elzbieta.      •Consumir alimentos ricos en fibra, bharath frijoles, cereales integrales, y frutas y verduras frescas.      •Limitar el consumo de alimentos ricos en grasa y azúcares procesados, bharath los alimentos fritos o dulces.          Si tiene un collarín cervical:     •Use el collarín bharath se lo haya indicado el médico. No se lo quite a menos que se lo indiquen.      •Consulte antes de hacerle ajustes al collarín.      •Si tiene el pelo danita, manténgalo fuera del collarín.    •Pregúntele al médico si puede quitarse el collarín para bañarse e higienizarse. En joya jamie:  •Siga las instrucciones sobre cómo retirarlo de manera mack.      •Lávelo a mano con agua y jabón suave, y séquelo al aire por completo.      •Si el collarín tiene almohadillas desmontables, quítelas cada 1 o 2 días y lávelas a mano con agua y jabón. Déjelas que se sequen por completo antes de volver a ponerlas en el collarín.        •Informe al médico si tiene irritación o llagas en la piel debajo del collarín.        Control del dolor, la rigidez y la hinchazón                   •Si se lo indican, use un dispositivo de tracción cervical bharath se lo hayan explicado.    •Si se lo indican, aplique hielo sobre la lizandro afectada. Para hacer esto:  •Ponga el hielo en ulices bolsa plástica.      •Coloque ulices toalla entre la piel y la bolsa.      •Aplique el hielo vitaliy 20 minutos, 2 a 3 veces por día.      •Si se lo indican, aplique calor en la lizandro afectada antes de hacer los ejercicios de fisioterapia o con la frecuencia que le haya indicado el médico. Use la kyra de calor que el médico le recomiende, bharath ulices compresa de calor húmedo o ulices almohadilla térmica.  •Coloque ulices toalla entre la piel y la kyra de calor.      •Aplique calor vitaliy 20 a 30 minutos.      •Retire la kyra de calor si la piel se pone de color castellanos brillante. Paraje es especialmente importante si no puede sentir dolor, calor o frío. Puede correr un riesgo mayor de sufrir quemaduras.        Actividad     • No conduzca mientras usa un collarín cervical. Si no tiene un collarín cervical, pregunte si es seguro que conduzca vitaliy el proceso de curación del lois.      • No levante ningún objeto que pese más de 10 libras (4.5 kg) o que supere el límite de peso que le hayan indicado, hasta que el médico le diga que puede hacerlo.      •Shamika reposo bharath se lo haya indicado el médico.      •Si le indican fisioterapia, shamika los ejercicios bharath se lo haya indicado el médico o el fisioterapeuta.      •Retome alyssa actividades normales según lo indicado por el médico. Evite las posiciones y actividades que empeoran los síntomas. Pregúntele al médico qué actividades son seguras para usted.      Instrucciones generales     • No consuma ningún producto que contenga nicotina o tabaco, bharath cigarrillos, cigarrillos electrónicos y tabaco de mascar. Estos pueden retrasar la recuperación. Si necesita ayuda para dejar de fumar, consulte al médico.      •Concurra a todas las visitas de seguimiento bharath se lo haya indicado el médico o fisioterapeuta. Paraje es importante.        ¿Cómo se previene?  Para evitar que se produzca nuevamente un esguince cervical:  •Mantenga ulices buena postura. Shamika los ajustes necesarios en palumbo lugar de trabajo para lograr esto.      •Shamika ejercicio regularmente bharath se lo haya indicado el fisioterapeuta o palumbo médico.      •Evite realizar actividades de riesgo que puedan causar un esguince cervical.        Comuníquese con un médico si tiene:    •Síntomas que empeoran o no mejoran después de 2 semanas de tratamiento.      •Un dolor que empeora o que no mejora con los medicamentos.      •Síntomas nuevos e inexplicables.      •Llagas o la piel irritada en el lois por el uso del collarín cervical.        Solicite ayuda de inmediato si:    •Siente dolor intenso.      •Siente adormecimiento, hormigueo o debilidad en cualquier parte del cuerpo.      •No puede  ulices parte del cuerpo (tiene parálisis).      •Siente dolor en el lois junto con mareos o dolor de pk intensos.        Resumen    •Un esguince cervical es ulices distensión o un desgarro de tima o más de los ligamentos del lois.      •Los esguinces cervicales pueden producirse por un traumatismo bharath, por ejemplo, ulices lesión recibida en un accidente automovilístico, ulices caída o un movimiento brusco de adelante hacia atrás de la pk y el lois (latigazo cervical).      •Los síntomas pueden manifestarse inmediatamente después de la lesión o en el transcurso de algunos días.      •Esta afección se puede tratar con reposo, hielo, calor, medicamentos, fisioterapia y cirugía.      Esta información no tiene bharath fin reemplazar el consejo del médico. Asegúrese de hacerle al médico cualquier pregunta que tenga.      [facility] CreolePortugueseSpanish                                                                                                                                                           Dolor musculoesqueléticoCuando el dolor es intenso    Musculoskeletal Pain      "Dolor musculoesquelético" hace referencia a los heather y las molestias en los huesos, las articulaciones, los músculos y los tejidos que los rodean. Joya dolor puede ocurrir en cualquier parte del cuerpo. Puede durar un breve período (mary) o prolongarse mucho tiempo (crónico).    Es posible que se realicen un examen físico, análisis de laboratorio y estudios de diagnóstico por imágenes para encontrar la causa del dolor musculoesquelético.      Siga estas indicaciones en palumbo casa:     Estilo de tresa   •Trate de controlar o reducir los niveles de estrés. El estrés aumenta la tensión muscular y puede empeorar el dolor musculoesquelético. Es importante reconocer cuando está ansioso o estresado y aprender distintas formas de controlar joya estado. Estas pueden incluir, entre otras, las siguientes:  •Yoga o meditación.      •Terapia cognitiva o conductual.      •Acupuntura o terapia de masajes.        •Podrá seguir con todas las actividades, a menos que estas le generen más dolor. Cuando el dolor disminuya, retome las actividades habituales de a poco. Aumente gradualmente la intensidad y la duración de las actividades o del ejercicio que realice.      Control del dolor, la rigidez y la hinchazón     •Combs los medicamentos de venta elzbieta y los recetados solamente bharath se lo haya indicado el médico.      •Si el dolor es intenso, el reposo en cama puede ser beneficioso. Acuéstese o siéntese en cualquier posición que sea cómoda, sarah salga de la cama y camine al menos cada dos horas.    •Si se lo indican, aplique calor en la lizandro afectada con la frecuencia que le haya indicado el médico. Use la kyra de calor que el médico le recomiende, bharath ulices compresa de calor húmedo o ulices almohadilla térmica.  •Coloque ulices toalla entre la piel y la kyra de calor.      •Aplique el calor vitaliy un período de 20 a 30 minutos.      •Retire la kyra de calor si la piel se pone de color castellanos brillante. Paraje es especialmente importante si no puede sentir dolor, calor o frío. Puede correr un riesgo mayor de sufrir quemaduras.      •Si se lo indican, aplique hielo sobre la lizandro dolorida.  •Ponga el hielo en ulices bolsa plástica.      •Coloque ulices toalla entre la piel y la bolsa de hielo.      •Coloque el hielo vitaliy 20 minutos, de 2 a 3 veces por día.        Instrucciones generales     •El médico puede recomendarle que consulte a un fisioterapeuta. Esta persona puede ayudarlo a elaborar un programa de ejercicios seguro. Shamika ejercicios bharath se lo haya indicado el fisioterapeuta.      •Concurra a todas las visitas de control, incluidas las sesiones de fisioterapia, bharath se lo hayan indicado los médicos. Paraje es importante.        Comuníquese con un médico si:    •El dolor empeora.      •Los medicamentos no alivian el dolor.      •No puede usar la parte del cuerpo que le duele, bharath un brazo, ulices pierna o el lois.      •Tiene dificultad para dormir.      •Tiene dificultad para realizar las actividades cotidianas.        Solicite ayuda de inmediato si:    •Tiene ulices nueva lesión o el dolor empeora o es diferente.      •Tiene adormecimiento u hormigueo en la lizandro dolorida.        Resumen    •"Dolor musculoesquelético" hace referencia a los heather y las molestias en los huesos, las articulaciones, los músculos y los tejidos que los rodean.      •Joya dolor puede ocurrir en cualquier parte del cuerpo.      •El médico puede recomendarle que consulte a un fisioterapeuta. Esta persona puede ayudarlo a elaborar un programa de ejercicios seguro. Shamika ejercicios bharath se lo haya indicado el fisioterapeuta.      •Disminuya palumbo nivel de estrés. El estrés puede empeorar el dolor musculoesquelético. Entre los métodos para disminuir el estrés se pueden mencionar la meditación, el yoga, la terapia cognitiva o conductual, la acupuntura y la terapia de masajes.      Esta información no tiene bharath fin reemplazar el consejo del médico. Asegúrese de hacerle al médico cualquier pregunta que tenga.    Rest. Drink plenty of fluids.  Take Ibuprofen 600 mg. PO every 6 hrs. for pain.  Take Flexeril as directed.  Follow up with PMD.

## 2021-06-26 NOTE — ED STATDOCS - PATIENT PORTAL LINK FT
You can access the FollowMyHealth Patient Portal offered by [facility] by registering at the following website: [email or web address]. By joining [employer]’s FollowMyHealth portal, you will also be able to view your health information using other applications (apps) compatible with our system.

## 2022-10-24 NOTE — DISCHARGE NOTE OB - BREASTFEEDING PROVIDES MATERNAL HEALTH BENEFITS, DECREASED PREMENOPAUSAL BREAST CANCER, OVARIAN CANCER AND TYPE II DIABETES MELLITUS
Discussed with Dr. Girard. Ok to place referral. Referral placed to Dr. Leon. PSR to call mom.    Statement Selected

## 2023-02-20 NOTE — ED STATDOCS - DISCHARGE DATE
26-Jun-2021 Cephalexin Pregnancy And Lactation Text: This medication is Pregnancy Category B and considered safe during pregnancy.  It is also excreted in breast milk but can be used safely for shorter doses.

## 2023-06-26 NOTE — ED ADULT NURSE NOTE - CAS TRG GENERAL NORM CIRC DET
[Pain is well-controlled] : pain is well-controlled [Clean/Dry/Intact] : clean, dry and intact [Pathology reviewed] : pathology reviewed [Fever] : no fever [Chills] : no chills [Nausea] : no nausea [Vomiting] : no vomiting [Diarrhea] : no diarrhea [Vaginal Bleeding] : no vaginal bleeding [Pelvic Pressure] : no pelvic pressure [Dysuria] : no dysuria [Vaginal Discharge] : no vaginal discharge [Constipation] : no constipation [Erythema] : not erythematous [Swelling] : not swollen [Dehiscence] : not dehisced [de-identified] : 24 [de-identified] : laparoscopic left ovarian cystectomy [de-identified] : left ovarian cyst [de-identified] : gen: NAD abd: soft, ND/Nt, no rebound or guarding ext: NTBL Capillary refill less/equal to 2 seconds/Strong peripheral pulses

## 2024-05-23 NOTE — ED STATDOCS - CHPI ED SYMPTOM POS
preoperative waiting area, was taken back to the operating room where she underwent general anesthesia.  The bed was turned 90 degrees.    I did place a tooth guard over the upper cheek.  I then started with a size 22 dilation bougie and I did a size 22, 24, 26, 28, 30, and 32.  At that point, they did go through very smoothly right into the esophagus without providing any extra pressure.  They did slide the full length down to 40 cm without any problems.  I then after going to 32, I did a 33, 34, 36, 38, 40, 42, 44, 46, and 48, and I did stop at 48.  Again, these were going through smoothly.  With the 48, it did go through smoothly, but it was getting a little tight, so I did repeat the same dilation 3 times, and at that point, it was going in smoothly.  So, I did remove the bougie after the last one.  There was no sign of any bleeding.  Tooth guard was removed.  No sign of any trauma to the lips, teeth, or gums, and then the patient was awakened and she was taken to the postop recovery room in a stable condition.        FLEX GOMEZ DO      TSS/AQS  D:  05/23/2024 08:43:05  T:  05/23/2024 11:36:31  JOB #:  [id number]      PAIN